# Patient Record
Sex: MALE | NOT HISPANIC OR LATINO | URBAN - METROPOLITAN AREA
[De-identification: names, ages, dates, MRNs, and addresses within clinical notes are randomized per-mention and may not be internally consistent; named-entity substitution may affect disease eponyms.]

---

## 2018-08-14 ENCOUNTER — OFFICE VISIT (OUTPATIENT)
Dept: URBAN - METROPOLITAN AREA CLINIC 76 | Facility: CLINIC | Age: 83
End: 2018-08-14
Payer: MEDICARE

## 2018-08-14 DIAGNOSIS — E11.9 TYPE 2 DIABETES MELLITUS W/O COMPLICATION: ICD-10-CM

## 2018-08-14 PROCEDURE — 92014 COMPRE OPH EXAM EST PT 1/>: CPT | Performed by: OPHTHALMOLOGY

## 2018-08-14 ASSESSMENT — INTRAOCULAR PRESSURE
OD: 12
OS: 12

## 2018-08-14 NOTE — IMPRESSION/PLAN
Impression: Amaurosis fugax: G45.3. Classic symptoms this morning, episode of acute vertical diplopia resolving quickly. 2 separate neurological events recently. Hx of heart valve replacement- stopped plavix 2 weeks ago. Plan: Discussed diagnosis in detail with patient. recommend follow up with PCP. will call Florence Holland today. Recommend embolic workup.

## 2018-08-14 NOTE — IMPRESSION/PLAN
Impression: Diagnosis: Ocular hypertension, bilateral. Code: H40.053. IOP OU controlled on current regimen. No changes needed. Plan: Continue to monitor. Continue Azopt BID OD, Latanoprost QHS OU. Needs updated tests.

## 2018-08-14 NOTE — IMPRESSION/PLAN
Impression: Type 2 diabetes mellitus w/o complication: Z12.6. OU. Plan: No diabetic retinopathy, no signs of neovascularization noted. Discussed ocular and systemic benefits of blood sugar control.

## 2018-09-19 ENCOUNTER — OFFICE VISIT (OUTPATIENT)
Dept: URBAN - METROPOLITAN AREA CLINIC 76 | Facility: CLINIC | Age: 83
End: 2018-09-19
Payer: MEDICARE

## 2018-09-19 DIAGNOSIS — H40.053 OCULAR HYPERTENSION, BILATERAL: Primary | ICD-10-CM

## 2018-09-19 PROCEDURE — 92133 CPTRZD OPH DX IMG PST SGM ON: CPT | Performed by: OPHTHALMOLOGY

## 2018-09-19 PROCEDURE — 92014 COMPRE OPH EXAM EST PT 1/>: CPT | Performed by: OPHTHALMOLOGY

## 2018-09-19 PROCEDURE — 92083 EXTENDED VISUAL FIELD XM: CPT | Performed by: OPHTHALMOLOGY

## 2018-09-19 ASSESSMENT — INTRAOCULAR PRESSURE
OS: 13
OD: 13

## 2018-09-19 NOTE — IMPRESSION/PLAN
Impression: Diagnosis: Ocular hypertension, bilateral. Code: H40.053. IOP OU controlled on current regimen. No changes needed. VF with some fluctuation. OCT stable. Plan: Continue to monitor. Continue Azopt BID OD, Latanoprost QHS OU.

## 2018-12-18 ENCOUNTER — OFFICE VISIT (OUTPATIENT)
Dept: URBAN - METROPOLITAN AREA CLINIC 76 | Facility: CLINIC | Age: 83
End: 2018-12-18
Payer: MEDICARE

## 2018-12-18 DIAGNOSIS — Z96.1 PRESENCE OF INTRAOCULAR LENS: ICD-10-CM

## 2018-12-18 PROCEDURE — 99213 OFFICE O/P EST LOW 20 MIN: CPT | Performed by: OPHTHALMOLOGY

## 2018-12-18 ASSESSMENT — INTRAOCULAR PRESSURE
OS: 12
OD: 12

## 2018-12-18 NOTE — IMPRESSION/PLAN
Impression: Diagnosis: Ocular hypertension, bilateral. Code: H40.053. OU
IOP OU controlled on current regimen. No changes needed. Plan: Continue to monitor. Continue Azopt BID OD, Latanoprost QHS OU.

## 2018-12-18 NOTE — IMPRESSION/PLAN
Impression: Type 2 diabetes mellitus w/o complication: X56.1. OU. Plan: No diabetic retinopathy, no signs of neovascularization noted. Discussed ocular and systemic benefits of blood sugar control.

## 2018-12-18 NOTE — IMPRESSION/PLAN
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 intermittent Plan: Discussed diagnosis in detail with patient. Will continue to observe condition and or symptoms. patient to call with any changes.

## 2018-12-20 ENCOUNTER — TESTING ONLY (OUTPATIENT)
Dept: URBAN - METROPOLITAN AREA CLINIC 76 | Facility: CLINIC | Age: 83
End: 2018-12-20

## 2018-12-20 DIAGNOSIS — H52.4 PRESBYOPIA: Primary | ICD-10-CM

## 2018-12-20 ASSESSMENT — KERATOMETRY
OD: 43.00
OS: 43.00

## 2018-12-20 ASSESSMENT — VISUAL ACUITY
OS: 20/20
OD: 20/25

## 2018-12-20 NOTE — IMPRESSION/PLAN
Impression: Presbyopia: H52.4 OU. Plan: Discussed condition. New mrx given today. Pt to call with any concerns.

## 2019-01-11 ENCOUNTER — TESTING ONLY (OUTPATIENT)
Dept: URBAN - METROPOLITAN AREA CLINIC 76 | Facility: CLINIC | Age: 84
End: 2019-01-11

## 2019-01-11 PROCEDURE — V2799 MISC VISION ITEM OR SERVICE: HCPCS | Performed by: OPTOMETRIST

## 2019-01-11 NOTE — IMPRESSION/PLAN
Impression: Presbyopia: H52.4 OU. Plan: Discussed condition. New mrx given today. Pt to call with any concerns.  's re-make

## 2019-04-16 ENCOUNTER — OFFICE VISIT (OUTPATIENT)
Dept: URBAN - METROPOLITAN AREA CLINIC 76 | Facility: CLINIC | Age: 84
End: 2019-04-16
Payer: MEDICARE

## 2019-04-16 DIAGNOSIS — H35.363 DRUSEN (DEGENERATIVE) OF MACULA, BILATERAL: ICD-10-CM

## 2019-04-16 DIAGNOSIS — H53.2 DIPLOPIA: ICD-10-CM

## 2019-04-16 PROCEDURE — 99213 OFFICE O/P EST LOW 20 MIN: CPT | Performed by: OPHTHALMOLOGY

## 2019-04-16 ASSESSMENT — INTRAOCULAR PRESSURE
OD: 13
OS: 14

## 2019-04-16 NOTE — IMPRESSION/PLAN
Impression: Type 2 diabetes mellitus w/o complication: S56.4. OU. Plan: No diabetic retinopathy, no signs of neovascularization noted. Discussed ocular and systemic benefits of blood sugar control.

## 2019-04-16 NOTE — IMPRESSION/PLAN
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 noticing less often  Plan: Will continue to observe condition and or symptoms. patient to call with any changes.

## 2019-04-16 NOTE — IMPRESSION/PLAN
Impression: Diagnosis: Ocular hypertension, bilateral. Code: H40.053. OU
IOP OU controlled on current regimen. No changes needed. Plan: Continue to monitor. Continue Azopt BID OD, Latanoprost QHS OU. Needs updated tests. ok to go to 6 month appts.

## 2019-10-07 ENCOUNTER — OFFICE VISIT (OUTPATIENT)
Dept: URBAN - METROPOLITAN AREA CLINIC 76 | Facility: CLINIC | Age: 84
End: 2019-10-07
Payer: MEDICARE

## 2019-10-07 DIAGNOSIS — G45.3 AMAUROSIS FUGAX: ICD-10-CM

## 2019-10-07 PROCEDURE — 92133 CPTRZD OPH DX IMG PST SGM ON: CPT | Performed by: OPHTHALMOLOGY

## 2019-10-07 PROCEDURE — 92014 COMPRE OPH EXAM EST PT 1/>: CPT | Performed by: OPHTHALMOLOGY

## 2019-10-07 PROCEDURE — 92083 EXTENDED VISUAL FIELD XM: CPT | Performed by: OPHTHALMOLOGY

## 2019-10-07 ASSESSMENT — INTRAOCULAR PRESSURE
OS: 13
OD: 12

## 2019-10-07 NOTE — IMPRESSION/PLAN
Impression: Amaurosis fugax: G45. 3. possible repeat episode. workup done last year was normal. Plan: Discussed diagnosis in detail with patient. Discussed the option to see Florence Holland sooner, patient declined will keep current appt in Jan. 
advised patient to call with any changes.

## 2019-10-07 NOTE — IMPRESSION/PLAN
Impression: Type 2 diabetes mellitus w/o complication: A71.9. OU. Plan: No diabetic retinopathy, no signs of neovascularization noted. Discussed ocular and systemic benefits of blood sugar control.

## 2019-10-07 NOTE — IMPRESSION/PLAN
Impression: Diagnosis: Ocular hypertension, bilateral. Code: H40.053. OU
IOP OU controlled on current regimen. No changes needed. OCT stable compared to last.  VF stable compared to last. Plan: Continue to monitor. Continue Azopt BID OD, Latanoprost QHS OU.  ok to go to 6 month appts.

## 2020-04-21 ENCOUNTER — OFFICE VISIT (OUTPATIENT)
Dept: URBAN - METROPOLITAN AREA CLINIC 76 | Facility: CLINIC | Age: 85
End: 2020-04-21
Payer: MEDICARE

## 2020-04-21 DIAGNOSIS — H43.813 VITREOUS DEGENERATION, BILATERAL: ICD-10-CM

## 2020-04-21 PROCEDURE — 99213 OFFICE O/P EST LOW 20 MIN: CPT | Performed by: OPHTHALMOLOGY

## 2020-04-21 ASSESSMENT — INTRAOCULAR PRESSURE
OS: 15
OD: 15

## 2020-04-21 NOTE — IMPRESSION/PLAN
Impression: Diagnosis: Ocular hypertension, bilateral. Code: H40.053. OU
IOP OU controlled on current regimen. No changes needed. Plan: Continue to monitor. Continue Azopt BID OD, Latanoprost QHS OU.  ok to go to 6 month appts. Needs updated tests.

## 2020-04-21 NOTE — IMPRESSION/PLAN
Impression: Type 2 diabetes mellitus w/o complication: Y49.7. OU. Plan: No diabetic retinopathy, no signs of neovascularization noted. Discussed ocular and systemic benefits of blood sugar control.

## 2021-03-16 ENCOUNTER — OFFICE VISIT (OUTPATIENT)
Dept: URBAN - METROPOLITAN AREA CLINIC 76 | Facility: CLINIC | Age: 86
End: 2021-03-16
Payer: MEDICARE

## 2021-03-16 DIAGNOSIS — H35.3131 BILATERAL NONEXUDATIVE AGE-RELATED MACULAR DEGENERATION, EARLY DRY STAGE: ICD-10-CM

## 2021-03-16 PROCEDURE — 92014 COMPRE OPH EXAM EST PT 1/>: CPT | Performed by: OPTOMETRIST

## 2021-03-16 PROCEDURE — 92133 CPTRZD OPH DX IMG PST SGM ON: CPT | Performed by: OPTOMETRIST

## 2021-03-16 PROCEDURE — 92083 EXTENDED VISUAL FIELD XM: CPT | Performed by: OPTOMETRIST

## 2021-03-16 RX ORDER — LATANOPROST 50 UG/ML
0.005 % SOLUTION OPHTHALMIC
Qty: 15 | Refills: 3 | Status: INACTIVE
Start: 2021-03-16 | End: 2021-10-26

## 2021-03-16 RX ORDER — GLIPIZIDE 10 MG/1
10 MG TABLET ORAL
Refills: 0 | Status: ACTIVE
Start: 2021-03-16

## 2021-03-16 ASSESSMENT — INTRAOCULAR PRESSURE
OD: 11
OS: 10

## 2021-03-16 NOTE — IMPRESSION/PLAN
Impression: Type 2 diabetes mellitus w/o complication: H04.6. OU. Plan: DM: No diabetic retinopathy. Discussed ocular and systemic benefits of blood sugar control. Patient was instructed to monitor vision for changes and to call if noted.

## 2021-03-16 NOTE — IMPRESSION/PLAN
Impression: Diagnosis: Ocular hypertension, bilateral. Code: H40.053. IOP good OU today. VF 3/16/21 OD: WNL/stable, OS: nasal defect maybe worse. OCT 3/16/21: WNL/stable OU. Dr. Canela Boys on medical leave. Plan: Discussed condition. Continue Azopt BID OD and Latanoprost QHS OU. Repeat VF/OCT 3/2022.

## 2021-03-16 NOTE — IMPRESSION/PLAN
Impression: Diagnosis: Presence of intraocular lens. Code: A44. s/p yag OU. Plan: Continue to monitor.

## 2021-03-16 NOTE — IMPRESSION/PLAN
Impression: Bilateral nonexudative age-related macular degeneration, early dry stage: H35.3131. Plan: AMD: Discussed condition in detail. AMD and AREDS education given. Amsler grid given and explained how and when to use. Call if symptoms worsen. Will continue to monitor. Recommend baseline MAC OCT at next visit.

## 2021-10-26 ENCOUNTER — OFFICE VISIT (OUTPATIENT)
Dept: URBAN - METROPOLITAN AREA CLINIC 23 | Facility: CLINIC | Age: 86
End: 2021-10-26
Payer: MEDICARE

## 2021-10-26 PROCEDURE — 99213 OFFICE O/P EST LOW 20 MIN: CPT | Performed by: OPTOMETRIST

## 2021-10-26 RX ORDER — LATANOPROST 50 UG/ML
0.005 % SOLUTION OPHTHALMIC
Qty: 15 | Refills: 3 | Status: INACTIVE
Start: 2021-10-26 | End: 2022-03-29

## 2021-10-26 ASSESSMENT — INTRAOCULAR PRESSURE
OD: 12
OS: 10

## 2021-10-26 NOTE — IMPRESSION/PLAN
Impression: Ocular hypertension, bilateral: H40.053. Plan: Discussed diagnosis with patient. Continue Latanoprost QHS OU & D/C Azopt. Consider going back on 2nd drop if change in IOP at next visit.

## 2022-04-13 ENCOUNTER — OFFICE VISIT (OUTPATIENT)
Dept: URBAN - METROPOLITAN AREA CLINIC 23 | Facility: CLINIC | Age: 87
End: 2022-04-13
Payer: MEDICARE

## 2022-04-13 DIAGNOSIS — E11.9 TYPE 2 DIABETES MELLITUS W/O COMPLICATION: ICD-10-CM

## 2022-04-13 DIAGNOSIS — H35.3131 NONEXUDATIVE AGE-RELATED MACULAR DEGENERATION, BILATERAL, EARLY DRY STAGE: ICD-10-CM

## 2022-04-13 DIAGNOSIS — H40.053 OCULAR HYPERTENSION, BILATERAL: Primary | ICD-10-CM

## 2022-04-13 PROCEDURE — 99213 OFFICE O/P EST LOW 20 MIN: CPT | Performed by: OPTOMETRIST

## 2022-04-13 PROCEDURE — 92134 CPTRZ OPH DX IMG PST SGM RTA: CPT | Performed by: OPTOMETRIST

## 2022-04-13 PROCEDURE — 92133 CPTRZD OPH DX IMG PST SGM ON: CPT | Performed by: OPTOMETRIST

## 2022-04-13 NOTE — IMPRESSION/PLAN
Impression: Ocular hypertension, bilateral: H40.053 Bilateral. Condition: established, stable. Plan: Pt has Ocular HTN        Pachs:  [phone]    Today's IOP :   12/12      Tmax  : 18/17 Target IOP low to mid teens Pt denies Fhx of Glaucoma Left eye is the better seeing eye VF: 03/16/2021, overdue C/D: .2 round OU
OCT: 04/13/2022, 78 OD / 75 OS
DE: 04/13/2022 Pt denies Sulfa Allergy Pt recent dx of pneumonia Pt has tachycardia and atrial stenosis Plan :
1. No drops, discontinue Latanoprost.
2. IOP low today and ONH continues to be healthy and stable. Advised patient to discontinue Latanoprost. Will continue to monitor.

## 2022-04-13 NOTE — IMPRESSION/PLAN
Impression: Nonexudative age-related macular degeneration, bilateral, early dry stage: H35.3131 Bilateral. Condition: established, stable. Plan: Macular degeneration, dry type with stable vision. Will continue to monitor vision and the patient has been instructed to call with any vision changes. Recommend diet rich with anti-oxidant foods.

## 2022-04-13 NOTE — IMPRESSION/PLAN
Impression: Type 2 diabetes mellitus w/o complication: D02.9 Bilateral. Condition: established, stable. Plan: Diabetes type II: no background retinopathy, no signs of neovascularization noted. Discussed ocular and systemic benefits of blood sugar control. Monitor yearly.

## 2022-05-17 ENCOUNTER — OFFICE VISIT (OUTPATIENT)
Dept: URBAN - METROPOLITAN AREA CLINIC 23 | Facility: CLINIC | Age: 87
End: 2022-05-17
Payer: MEDICARE

## 2022-05-17 DIAGNOSIS — H40.053 OCULAR HYPERTENSION, BILATERAL: Primary | ICD-10-CM

## 2022-05-17 PROCEDURE — 92083 EXTENDED VISUAL FIELD XM: CPT | Performed by: OPTOMETRIST

## 2022-05-17 PROCEDURE — 99213 OFFICE O/P EST LOW 20 MIN: CPT | Performed by: OPTOMETRIST

## 2022-05-17 ASSESSMENT — INTRAOCULAR PRESSURE
OD: 16
OS: 15

## 2022-05-17 ASSESSMENT — KERATOMETRY: OD: 43.25

## 2022-05-17 NOTE — IMPRESSION/PLAN
Impression: Ocular hypertension, bilateral: H40.053 Bilateral. Condition: established, stable. Plan: Pt has Ocular HTN        Pachs:  555/566    Today's IOP :   16/15      Tmax  : 18/17 Target IOP low to mid teens Pt denies Fhx of Glaucoma Left eye is the better seeing eye VF: 05/17/2022, unreliable results C/D: .2 round OU
OCT: 04/13/2022, 78 OD / 75 OS
DE: 04/13/2022 (Diabetic) Pt denies Sulfa Allergy Pt recent dx of pneumonia Pt has tachycardia and atrial stenosis Plan :
1. No drops at this time. 2. IOP slightly elevated but no treatment warranted at this time. VF performed today with unreliable results. Call with any vision changes, monitor with 6 month IOP checks.

## 2022-11-17 ENCOUNTER — OFFICE VISIT (OUTPATIENT)
Dept: URBAN - METROPOLITAN AREA CLINIC 23 | Facility: CLINIC | Age: 87
End: 2022-11-17
Payer: MEDICARE

## 2022-11-17 DIAGNOSIS — H40.053 OCULAR HYPERTENSION, BILATERAL: Primary | ICD-10-CM

## 2022-11-17 PROCEDURE — 99213 OFFICE O/P EST LOW 20 MIN: CPT | Performed by: OPTOMETRIST

## 2022-11-17 ASSESSMENT — INTRAOCULAR PRESSURE
OD: 16
OS: 12

## 2023-11-22 ENCOUNTER — OFFICE VISIT (OUTPATIENT)
Dept: URBAN - METROPOLITAN AREA CLINIC 23 | Facility: CLINIC | Age: 88
End: 2023-11-22
Payer: MEDICARE

## 2023-11-22 DIAGNOSIS — H35.3131 NONEXUDATIVE AGE-RELATED MACULAR DEGENERATION, BILATERAL, EARLY DRY STAGE: ICD-10-CM

## 2023-11-22 DIAGNOSIS — E11.9 TYPE 2 DIABETES MELLITUS WITHOUT COMPLICATIONS: Primary | ICD-10-CM

## 2023-11-22 DIAGNOSIS — H40.053 OCULAR HYPERTENSION, BILATERAL: ICD-10-CM

## 2023-11-22 PROCEDURE — 99213 OFFICE O/P EST LOW 20 MIN: CPT | Performed by: OPTOMETRIST

## 2023-11-22 PROCEDURE — 92133 CPTRZD OPH DX IMG PST SGM ON: CPT | Performed by: OPTOMETRIST

## 2023-11-22 ASSESSMENT — INTRAOCULAR PRESSURE
OS: 18
OD: 18

## 2023-11-22 ASSESSMENT — KERATOMETRY
OD: 43.50
OS: 43.50